# Patient Record
Sex: FEMALE | Race: BLACK OR AFRICAN AMERICAN | NOT HISPANIC OR LATINO | URBAN - METROPOLITAN AREA
[De-identification: names, ages, dates, MRNs, and addresses within clinical notes are randomized per-mention and may not be internally consistent; named-entity substitution may affect disease eponyms.]

---

## 2020-03-23 ENCOUNTER — EMERGENCY (EMERGENCY)
Facility: HOSPITAL | Age: 60
LOS: 1 days | Discharge: HOME | End: 2020-03-23
Attending: EMERGENCY MEDICINE | Admitting: EMERGENCY MEDICINE
Payer: SELF-PAY

## 2020-03-23 VITALS
TEMPERATURE: 99 F | OXYGEN SATURATION: 97 % | DIASTOLIC BLOOD PRESSURE: 81 MMHG | HEART RATE: 87 BPM | RESPIRATION RATE: 18 BRPM | SYSTOLIC BLOOD PRESSURE: 125 MMHG

## 2020-03-23 VITALS
HEART RATE: 123 BPM | RESPIRATION RATE: 21 BRPM | TEMPERATURE: 104 F | SYSTOLIC BLOOD PRESSURE: 115 MMHG | OXYGEN SATURATION: 88 % | DIASTOLIC BLOOD PRESSURE: 78 MMHG

## 2020-03-23 DIAGNOSIS — R06.02 SHORTNESS OF BREATH: ICD-10-CM

## 2020-03-23 DIAGNOSIS — J06.9 ACUTE UPPER RESPIRATORY INFECTION, UNSPECIFIED: ICD-10-CM

## 2020-03-23 DIAGNOSIS — E11.65 TYPE 2 DIABETES MELLITUS WITH HYPERGLYCEMIA: ICD-10-CM

## 2020-03-23 LAB
ALBUMIN SERPL ELPH-MCNC: 2.8 G/DL — LOW (ref 3.3–5)
ALBUMIN SERPL ELPH-MCNC: 3.8 G/DL — SIGNIFICANT CHANGE UP (ref 3.3–5)
ALP SERPL-CCNC: 74 U/L — SIGNIFICANT CHANGE UP (ref 40–120)
ALP SERPL-CCNC: SIGNIFICANT CHANGE UP U/L (ref 40–120)
ALT FLD-CCNC: 14 U/L — SIGNIFICANT CHANGE UP (ref 10–45)
ALT FLD-CCNC: SIGNIFICANT CHANGE UP U/L (ref 10–45)
ANION GAP SERPL CALC-SCNC: 12 MMOL/L — SIGNIFICANT CHANGE UP (ref 5–17)
ANION GAP SERPL CALC-SCNC: 15 MMOL/L — SIGNIFICANT CHANGE UP (ref 5–17)
APPEARANCE UR: CLEAR — SIGNIFICANT CHANGE UP
APTT BLD: 27.9 SEC — SIGNIFICANT CHANGE UP (ref 27.5–36.3)
AST SERPL-CCNC: 24 U/L — SIGNIFICANT CHANGE UP (ref 10–40)
AST SERPL-CCNC: SIGNIFICANT CHANGE UP U/L (ref 10–40)
BASOPHILS # BLD AUTO: 0.01 K/UL — SIGNIFICANT CHANGE UP (ref 0–0.2)
BASOPHILS NFR BLD AUTO: 0.1 % — SIGNIFICANT CHANGE UP (ref 0–2)
BILIRUB SERPL-MCNC: 0.4 MG/DL — SIGNIFICANT CHANGE UP (ref 0.2–1.2)
BILIRUB SERPL-MCNC: 0.5 MG/DL — SIGNIFICANT CHANGE UP (ref 0.2–1.2)
BILIRUB UR-MCNC: NEGATIVE — SIGNIFICANT CHANGE UP
BUN SERPL-MCNC: 26 MG/DL — HIGH (ref 7–23)
BUN SERPL-MCNC: 27 MG/DL — HIGH (ref 7–23)
CALCIUM SERPL-MCNC: 8 MG/DL — LOW (ref 8.4–10.5)
CALCIUM SERPL-MCNC: 9.3 MG/DL — SIGNIFICANT CHANGE UP (ref 8.4–10.5)
CHLORIDE SERPL-SCNC: 88 MMOL/L — LOW (ref 96–108)
CHLORIDE SERPL-SCNC: 97 MMOL/L — SIGNIFICANT CHANGE UP (ref 96–108)
CO2 SERPL-SCNC: 23 MMOL/L — SIGNIFICANT CHANGE UP (ref 22–31)
CO2 SERPL-SCNC: 25 MMOL/L — SIGNIFICANT CHANGE UP (ref 22–31)
COLOR SPEC: YELLOW — SIGNIFICANT CHANGE UP
CREAT SERPL-MCNC: 1.06 MG/DL — SIGNIFICANT CHANGE UP (ref 0.5–1.3)
CREAT SERPL-MCNC: 1.07 MG/DL — SIGNIFICANT CHANGE UP (ref 0.5–1.3)
DIFF PNL FLD: NEGATIVE — SIGNIFICANT CHANGE UP
EOSINOPHIL # BLD AUTO: 0 K/UL — SIGNIFICANT CHANGE UP (ref 0–0.5)
EOSINOPHIL NFR BLD AUTO: 0 % — SIGNIFICANT CHANGE UP (ref 0–6)
GAS PNL BLDV: SIGNIFICANT CHANGE UP
GLUCOSE SERPL-MCNC: 389 MG/DL — HIGH (ref 70–99)
GLUCOSE SERPL-MCNC: 454 MG/DL — CRITICAL HIGH (ref 70–99)
GLUCOSE UR QL: >=1000
HCT VFR BLD CALC: 42.8 % — SIGNIFICANT CHANGE UP (ref 34.5–45)
HGB BLD-MCNC: 14 G/DL — SIGNIFICANT CHANGE UP (ref 11.5–15.5)
IMM GRANULOCYTES NFR BLD AUTO: 0.5 % — SIGNIFICANT CHANGE UP (ref 0–1.5)
INR BLD: 1.23 — HIGH (ref 0.88–1.16)
KETONES UR-MCNC: ABNORMAL MG/DL
LACTATE SERPL-SCNC: 1.4 MMOL/L — SIGNIFICANT CHANGE UP (ref 0.5–2)
LACTATE SERPL-SCNC: 2.2 MMOL/L — HIGH (ref 0.5–2)
LEUKOCYTE ESTERASE UR-ACNC: NEGATIVE — SIGNIFICANT CHANGE UP
LYMPHOCYTES # BLD AUTO: 1.22 K/UL — SIGNIFICANT CHANGE UP (ref 1–3.3)
LYMPHOCYTES # BLD AUTO: 15.1 % — SIGNIFICANT CHANGE UP (ref 13–44)
MCHC RBC-ENTMCNC: 26 PG — LOW (ref 27–34)
MCHC RBC-ENTMCNC: 32.7 GM/DL — SIGNIFICANT CHANGE UP (ref 32–36)
MCV RBC AUTO: 79.6 FL — LOW (ref 80–100)
MONOCYTES # BLD AUTO: 0.43 K/UL — SIGNIFICANT CHANGE UP (ref 0–0.9)
MONOCYTES NFR BLD AUTO: 5.3 % — SIGNIFICANT CHANGE UP (ref 2–14)
NEUTROPHILS # BLD AUTO: 6.38 K/UL — SIGNIFICANT CHANGE UP (ref 1.8–7.4)
NEUTROPHILS NFR BLD AUTO: 79 % — HIGH (ref 43–77)
NITRITE UR-MCNC: NEGATIVE — SIGNIFICANT CHANGE UP
NRBC # BLD: 0 /100 WBCS — SIGNIFICANT CHANGE UP (ref 0–0)
PH UR: 6 — SIGNIFICANT CHANGE UP (ref 5–8)
PLATELET # BLD AUTO: 193 K/UL — SIGNIFICANT CHANGE UP (ref 150–400)
POTASSIUM SERPL-MCNC: 4.6 MMOL/L — SIGNIFICANT CHANGE UP (ref 3.5–5.3)
POTASSIUM SERPL-MCNC: SIGNIFICANT CHANGE UP MMOL/L (ref 3.5–5.3)
POTASSIUM SERPL-SCNC: 4.6 MMOL/L — SIGNIFICANT CHANGE UP (ref 3.5–5.3)
POTASSIUM SERPL-SCNC: SIGNIFICANT CHANGE UP MMOL/L (ref 3.5–5.3)
PROT SERPL-MCNC: 5.7 G/DL — LOW (ref 6–8.3)
PROT SERPL-MCNC: 8.1 G/DL — SIGNIFICANT CHANGE UP (ref 6–8.3)
PROT UR-MCNC: ABNORMAL MG/DL
PROTHROM AB SERPL-ACNC: 14.1 SEC — HIGH (ref 10–12.9)
RAPID RVP RESULT: SIGNIFICANT CHANGE UP
RBC # BLD: 5.38 M/UL — HIGH (ref 3.8–5.2)
RBC # FLD: 11.8 % — SIGNIFICANT CHANGE UP (ref 10.3–14.5)
SODIUM SERPL-SCNC: 128 MMOL/L — LOW (ref 135–145)
SODIUM SERPL-SCNC: 132 MMOL/L — LOW (ref 135–145)
SP GR SPEC: 1.02 — SIGNIFICANT CHANGE UP (ref 1–1.03)
UROBILINOGEN FLD QL: 0.2 E.U./DL — SIGNIFICANT CHANGE UP
WBC # BLD: 8.08 K/UL — SIGNIFICANT CHANGE UP (ref 3.8–10.5)
WBC # FLD AUTO: 8.08 K/UL — SIGNIFICANT CHANGE UP (ref 3.8–10.5)

## 2020-03-23 PROCEDURE — 84295 ASSAY OF SERUM SODIUM: CPT

## 2020-03-23 PROCEDURE — 99285 EMERGENCY DEPT VISIT HI MDM: CPT

## 2020-03-23 PROCEDURE — 87633 RESP VIRUS 12-25 TARGETS: CPT

## 2020-03-23 PROCEDURE — 71045 X-RAY EXAM CHEST 1 VIEW: CPT

## 2020-03-23 PROCEDURE — 36415 COLL VENOUS BLD VENIPUNCTURE: CPT

## 2020-03-23 PROCEDURE — 87086 URINE CULTURE/COLONY COUNT: CPT

## 2020-03-23 PROCEDURE — 93010 ELECTROCARDIOGRAM REPORT: CPT

## 2020-03-23 PROCEDURE — 87581 M.PNEUMON DNA AMP PROBE: CPT

## 2020-03-23 PROCEDURE — 84132 ASSAY OF SERUM POTASSIUM: CPT

## 2020-03-23 PROCEDURE — 83605 ASSAY OF LACTIC ACID: CPT

## 2020-03-23 PROCEDURE — 87798 DETECT AGENT NOS DNA AMP: CPT

## 2020-03-23 PROCEDURE — 87635 SARS-COV-2 COVID-19 AMP PRB: CPT

## 2020-03-23 PROCEDURE — 87040 BLOOD CULTURE FOR BACTERIA: CPT

## 2020-03-23 PROCEDURE — 82803 BLOOD GASES ANY COMBINATION: CPT

## 2020-03-23 PROCEDURE — 81001 URINALYSIS AUTO W/SCOPE: CPT

## 2020-03-23 PROCEDURE — 93005 ELECTROCARDIOGRAM TRACING: CPT

## 2020-03-23 PROCEDURE — 85025 COMPLETE CBC W/AUTO DIFF WBC: CPT

## 2020-03-23 PROCEDURE — 87486 CHLMYD PNEUM DNA AMP PROBE: CPT

## 2020-03-23 PROCEDURE — 80053 COMPREHEN METABOLIC PANEL: CPT

## 2020-03-23 PROCEDURE — 85730 THROMBOPLASTIN TIME PARTIAL: CPT

## 2020-03-23 PROCEDURE — 85610 PROTHROMBIN TIME: CPT

## 2020-03-23 PROCEDURE — 71045 X-RAY EXAM CHEST 1 VIEW: CPT | Mod: 26

## 2020-03-23 PROCEDURE — 99284 EMERGENCY DEPT VISIT MOD MDM: CPT | Mod: 25

## 2020-03-23 PROCEDURE — 82330 ASSAY OF CALCIUM: CPT

## 2020-03-23 RX ORDER — IBUPROFEN 200 MG
800 TABLET ORAL ONCE
Refills: 0 | Status: COMPLETED | OUTPATIENT
Start: 2020-03-23 | End: 2020-03-23

## 2020-03-23 RX ORDER — SODIUM CHLORIDE 9 MG/ML
2500 INJECTION INTRAMUSCULAR; INTRAVENOUS; SUBCUTANEOUS ONCE
Refills: 0 | Status: COMPLETED | OUTPATIENT
Start: 2020-03-23 | End: 2020-03-23

## 2020-03-23 RX ORDER — ACETAMINOPHEN 500 MG
1000 TABLET ORAL ONCE
Refills: 0 | Status: DISCONTINUED | OUTPATIENT
Start: 2020-03-23 | End: 2020-03-23

## 2020-03-23 RX ADMIN — Medication 800 MILLIGRAM(S): at 12:40

## 2020-03-23 RX ADMIN — Medication 800 MILLIGRAM(S): at 13:46

## 2020-03-23 RX ADMIN — SODIUM CHLORIDE 2500 MILLILITER(S): 9 INJECTION INTRAMUSCULAR; INTRAVENOUS; SUBCUTANEOUS at 12:40

## 2020-03-23 NOTE — ED ADULT TRIAGE NOTE - OTHER COMPLAINTS
endorses above symptoms x6 days, with difficulty speaking in full sentences, hx DM. pt denies known sick contacts or recent travel. face mask in place.

## 2020-03-23 NOTE — ED PROVIDER NOTE - CLINICAL SUMMARY MEDICAL DECISION MAKING FREE TEXT BOX
Patient in ED with concern for cough, fever and not feeling well for the past several days.  No increased work of breathing, speaking comfortably in full sentences.  CXR unremarkable.  Labs and EKG reviewed.  Patient feeling significant improvement in symptoms with IVF, antipyretic.  She is aware of all studies and educated re need for continued strict glucose control.  Of note, no increased AG, hydrated and repeat glucose improving.  Patient is advised to follow up with their PCP in 1-2 days without fail via phone call to discuss symptoms, etc and ensure improvement.  Patient instructed to return to ED immediately should their symptoms worsen or if there is any concern prior to the recommended PCP follow up.  Patient is aware of plan and verbalizes their understanding.  Will discharge home at this time.

## 2020-03-23 NOTE — ED PROVIDER NOTE - PATIENT PORTAL LINK FT
You can access the FollowMyHealth Patient Portal offered by [facility] by registering at the following website: [email or web address]. By joining [facility]’s FollowMyHealth portal, you will also be able to view your health information using other applications (apps) compatible with our system.

## 2020-03-23 NOTE — ED PROVIDER NOTE - NS ED ROS FT
Constitutional: + fever, no chills.   Eyes: No pain, blurry vision, or discharge.  ENMT: No hearing changes, pain, discharge or infections. No neck pain or stiffness.  Cardiac: No chest pain, + SOB, no edema. No chest pain with exertion.  Respiratory: + cough, no respiratory distress. No hemoptysis. No history of asthma or RAD.  GI: No nausea, vomiting, diarrhea or abdominal pain.  : No dysuria, frequency or burning.  MS: No myalgia, muscle weakness, joint pain or back pain.  Neuro: No headache or weakness. No LOC.  Skin: No skin rash.   Endocrine: No history of thyroid disease or diabetes.  Except as documented in the HPI, all other systems are negative.

## 2020-03-23 NOTE — ED PROVIDER NOTE - NSFOLLOWUPINSTRUCTIONS_ED_ALL_ED_FT
Please follow up with your primary physician in 1-2 days for re evaluation.  Please return to ER immediately should your symptoms worsen or if you have any concern prior to this recommended follow up.    Upper Respiratory Infection    WHAT YOU NEED TO KNOW:    An upper respiratory infection is also called the common cold. It is an infection that can affect your nose, throat, ears, and sinuses. For healthy people, the common cold is usually not serious and does not need special treatment. Cold symptoms are usually worst for the first 3 to 5 days. Most people get better in 7 to 14 days. You may continue to cough for 2 to 3 weeks. Colds are caused by viruses and do not get better with antibiotics.     DISCHARGE INSTRUCTIONS:    Return to the emergency department if:     You have chest pain or trouble breathing.        Contact your healthcare provider if:     You have a fever over 102ºF (39°C).      Your sore throat gets worse or you see white or yellow spots in your throat.      Your symptoms get worse after 3 to 5 days or your cold is not better in 14 days.      You have a rash anywhere on your skin.      You have large, tender lumps in your neck.      You have thick, green or yellow drainage from your nose.      You cough up thick yellow, green, or bloody mucus.      You have vomiting for more than 24 hours and cannot keep fluids down.      You have a bad earache.      You have questions or concerns about your condition or care.    Medicines: You may need any of the following:     Decongestants help reduce nasal congestion and help you breathe more easily. If you take decongestant pills, they may make you feel restless or cause problems with your sleep. Do not use decongestant sprays for more than a few days.      Cough suppressants help reduce coughing. Ask your healthcare provider which type of cough medicine is best for you.       NSAIDs, such as ibuprofen, help decrease swelling, pain, and fever. NSAIDs can cause stomach bleeding or kidney problems in certain people. If you take blood thinner medicine, always ask your healthcare provider if NSAIDs are safe for you. Always read the medicine label and follow directions.      Acetaminophen decreases pain and fever. It is available without a doctor's order. Ask how much to take and how often to take it. Follow directions. Read the labels of all other medicines you are using to see if they also contain acetaminophen, or ask your doctor or pharmacist. Acetaminophen can cause liver damage if not taken correctly. Do not use more than 4 grams (4,000 milligrams) total of acetaminophen in one day.       Take your medicine as directed. Contact your healthcare provider if you think your medicine is not helping or if you have side effects. Tell him or her if you are allergic to any medicine. Keep a list of the medicines, vitamins, and herbs you take. Include the amounts, and when and why you take them. Bring the list or the pill bottles to follow-up visits. Carry your medicine list with you in case of an emergency.    Follow up with your healthcare provider as directed: Write down your questions so you remember to ask them during your visits.     Self-care:     Rest as much as possible. Slowly start to do more each day.       Drink more liquids as directed. Liquids will help thin and loosen mucus so you can cough it up. Liquids will also help prevent dehydration. Liquids that help prevent dehydration include water, fruit juice, and broth. Do not drink liquids that contain caffeine. Caffeine can increase your risk for dehydration. Ask your healthcare provider how much liquid to drink each day.       Soothe a sore throat. Gargle with warm salt water. This helps your sore throat feel better. Make salt water by dissolving ¼ teaspoon salt in 1 cup warm water. You may also suck on hard candy or throat lozenges. You may use a sore throat spray.      Use a humidifier or vaporizer. Use a cool mist humidifier or a vaporizer to increase air moisture in your home. This may make it easier for you to breathe and help decrease your cough.       Use saline nasal drops as directed. These help relieve congestion.       Apply petroleum-based jelly around the outside of your nostrils. This can decrease irritation from blowing your nose.       Do not smoke. Nicotine and other chemicals in cigarettes and cigars can make your symptoms worse. They can also cause infections such as bronchitis or pneumonia. Ask your healthcare provider for information if you currently smoke and need help to quit. E-cigarettes or smokeless tobacco still contain nicotine. Talk to your healthcare provider before you use these products.     Prevent spreading your cold to others:     Try to stay away from other people during the first 2 to 3 days of your cold when it is more easily spread.       Do not share food or drinks.       Do not share hand towels with household members.      Wash your hands often, especially after you blow your nose. Turn away from other people and cover your mouth and nose with a tissue when you sneeze or cough.Handwashing              © Copyright [employer] 2020       back to top                      © Copyright [employer] 2020

## 2020-03-23 NOTE — ED PROVIDER NOTE - OBJECTIVE STATEMENT
59 year old female with history of DM presents to ED with concern for shortness of breath and cough x 6 days.  Patient states her symptoms have been progressive over the past several days and she notes associated fever, body aches and chills.  She denies associated headache, sore throat, ear pain, abdominal pain, nausea, emesis, changes to bowel movements, rashes, recent travel, known sick contacts or any additional acute complaints or concerns at this time.  She has been taking tylenol for her symptoms with minimal improvement.

## 2020-03-23 NOTE — ED ADULT NURSE NOTE - OBJECTIVE STATEMENT
Pt. c/o flu-like Sx since Tuesday, c/o cough w/ white phlegm, SOB, HA, fever, generalized body aches. Highest temp 100 this AM, last took 1300 Tylenol at 10 am. Pt. is tachypneic, and SOB while conversing. Blue mask given and placed on droplet/contact precautions.

## 2020-03-23 NOTE — ED PROVIDER NOTE - PHYSICAL EXAMINATION
VITAL SIGNS: I have reviewed nursing notes and confirm.  CONSTITUTIONAL: Well-developed; well-nourished; in no acute distress.   SKIN:  warm and dry, no acute rash.   HEAD:  normocephalic, atraumatic.  EYES: PERRL.  EOM intact; conjunctiva and sclera clear.  ENT: No nasal discharge; airway clear.   NECK: Supple; non tender.  CARD: S1, S2 normal; no murmurs, gallops, or rubs. Regular rate and rhythm.   RESP:  No retractions.  Speaking in full sentences.  Clear to auscultation b/l, no wheezes, rales or rhonchi.  ABD: Normal bowel sounds; soft; non-distended; non-tender; no guarding/rebound.  EXT: Normal ROM. No clubbing, cyanosis or edema. 2+ pulses to b/l ue/le.  NEURO: Alert, oriented, grossly unremarkable.  5/5 strength x 4 extremities against gravity and external force.  No drift x 4 extremities.  Sensation intact and symmetric x 4 extremities.  No facial asymmetry.    PSYCH: Cooperative, mood and affect appropriate.

## 2020-03-23 NOTE — ED PROVIDER NOTE - CARE PLAN
Principal Discharge DX:	Upper respiratory tract infection, unspecified type  Secondary Diagnosis:	Hyperglycemia

## 2020-03-24 LAB
CULTURE RESULTS: NO GROWTH — SIGNIFICANT CHANGE UP
SARS-COV-2 RNA SPEC QL NAA+PROBE: DETECTED
SPECIMEN SOURCE: SIGNIFICANT CHANGE UP

## 2020-03-28 LAB
CULTURE RESULTS: SIGNIFICANT CHANGE UP
CULTURE RESULTS: SIGNIFICANT CHANGE UP
SPECIMEN SOURCE: SIGNIFICANT CHANGE UP
SPECIMEN SOURCE: SIGNIFICANT CHANGE UP

## 2022-10-14 NOTE — ED ADULT NURSE NOTE - SEPSIS REFERENCE DATA CRITERIA 2
Neck and back pain.  Unclear relation to radiation into the left face.  Normal head and neck exam.  Normal shoulder exam.  I suspect that this is a muscular injury and recommend a conservative approach.  Scheduled anti-inflammatories.  Referral for physical therapy.  She had a low back x-ray in May without abnormalities.  If not improving, consider referral to spine care (orthopedics).   Abnormal Lactate: > 2